# Patient Record
Sex: FEMALE | Race: WHITE | NOT HISPANIC OR LATINO | Employment: UNEMPLOYED | URBAN - METROPOLITAN AREA
[De-identification: names, ages, dates, MRNs, and addresses within clinical notes are randomized per-mention and may not be internally consistent; named-entity substitution may affect disease eponyms.]

---

## 2018-05-30 ENCOUNTER — APPOINTMENT (OUTPATIENT)
Dept: RADIOLOGY | Facility: CLINIC | Age: 58
End: 2018-05-30
Payer: COMMERCIAL

## 2018-05-30 ENCOUNTER — OFFICE VISIT (OUTPATIENT)
Dept: OBGYN CLINIC | Facility: CLINIC | Age: 58
End: 2018-05-30
Payer: COMMERCIAL

## 2018-05-30 VITALS — HEIGHT: 65 IN | BODY MASS INDEX: 21.99 KG/M2 | WEIGHT: 132 LBS

## 2018-05-30 DIAGNOSIS — M25.572 PAIN, JOINT, ANKLE AND FOOT, LEFT: ICD-10-CM

## 2018-05-30 DIAGNOSIS — S92.352A CLOSED FRACTURE OF BASE OF FIFTH METATARSAL BONE OF LEFT FOOT: Primary | ICD-10-CM

## 2018-05-30 DIAGNOSIS — S93.402A SPRAIN OF UNSPECIFIED LIGAMENT OF LEFT ANKLE, INITIAL ENCOUNTER: ICD-10-CM

## 2018-05-30 PROCEDURE — 73630 X-RAY EXAM OF FOOT: CPT

## 2018-05-30 PROCEDURE — 99203 OFFICE O/P NEW LOW 30 MIN: CPT | Performed by: ORTHOPAEDIC SURGERY

## 2018-05-30 NOTE — PROGRESS NOTES
Assessment/Plan:  1  Closed fracture of base of fifth metatarsal bone of left foot  XR ankle 3+ vw left    XR foot 3+ vw left   2  Sprain of unspecified ligament of left ankle, initial encounter         Scribe Attestation    I,:   Giancarlo Lazo am acting as a scribe while in the presence of the attending physician :        I,:   Radha Harden, DO personally performed the services described in this documentation    as scribed in my presence :            Lary Harper has left foot and ankle pain consistent with a mildly displaced fracture of the base of the 5th metatarsal  Her ankle pain seems to be consistent with a lateral ankle sprain  She was placed in a short cam walker boot  She can take the boot off to sleep and while she is resting  She can continue to ice and use compression as needed  She was advised to take an Asprin daily  She will follow up in 4 weeks for repeat evaluation and x-ray  Subjective:   Abner Daniels is a 60 y o  female who presents to the office today for left sided ankle and foot pain  About 2 1/2 weeks ago while traveling in [facility] she rolled her ankle  She had immediate pain and later in the day noticed swelling in her foot and ankle  She treated herself conservatively while in [facility] with compression stockings, ice, and topical pain cream   She said she wore supportive sneakers throughout the rest of the tripped and was only mildly uncomfortable and then worsened at night  At today's appointment she states her pain is a constant mild pain that increases with increased pressure on her foot and increased walking  She denies any new injury since the original injury about 2 weeks ago  She denies any radiating pain numbness or tingling at this time  Review of Systems   Constitutional: Negative for chills, fever and unexpected weight change  HENT: Negative for hearing loss, nosebleeds and sore throat  Eyes: Negative for pain, redness and visual disturbance     Respiratory: Negative for cough, shortness of breath and wheezing  Cardiovascular: Negative for chest pain, palpitations and leg swelling  Gastrointestinal: Negative for abdominal pain, nausea and vomiting  Endocrine: Negative for polydipsia and polyuria  Genitourinary: Negative for dysuria and hematuria  Musculoskeletal: Positive for arthralgias, joint swelling and myalgias  See HPI   Skin: Negative for rash and wound  Neurological: Negative for dizziness, numbness and headaches  Psychiatric/Behavioral: Negative for decreased concentration and suicidal ideas  The patient is not nervous/anxious  No past medical history on file  No past surgical history on file  No family history on file  Social History     Occupational History    Not on file  Social History Main Topics    Smoking status: Never Smoker    Smokeless tobacco: Never Used    Alcohol use No    Drug use: No    Sexual activity: Not on file       No current outpatient prescriptions on file  Allergies no known allergies    Objective: There were no vitals filed for this visit  Right Ankle Exam   Right ankle exam is normal       Left Ankle Exam   Swelling: mild    Tenderness   The patient is experiencing tenderness in the ATF (Base of the 5th metatarsal )  Range of Motion   The patient has normal left ankle ROM  Muscle Strength   The patient has normal left ankle strength  Other   Sensation: normal          Strength/Myotome Testing     Left Ankle/Foot   Normal strength      Physical Exam   Constitutional: She is oriented to person, place, and time  She appears well-developed and well-nourished  HENT:   Head: Normocephalic and atraumatic  Eyes: Conjunctivae are normal    Neck: Neck supple  Cardiovascular: Intact distal pulses  Pulmonary/Chest: Effort normal    Musculoskeletal:        Left foot: There is bony tenderness and swelling          Feet:    Neurological: She is alert and oriented to person, place, and time  Skin: Skin is warm and dry  Psychiatric: She has a normal mood and affect  Her behavior is normal    Vitals reviewed  I have personally reviewed pertinent films in PACS and my interpretation is as follows: Three-view left foot x-ray taken on May 30, 2018 shows a mild displaced fracture at the base of the 5th metatarsal   Three-view left ankle x-ray taken on May 30, 2018 shows no acute fracture dislocation

## 2018-09-14 ENCOUNTER — OFFICE VISIT (OUTPATIENT)
Dept: OBGYN CLINIC | Facility: CLINIC | Age: 58
End: 2018-09-14
Payer: COMMERCIAL

## 2018-09-14 ENCOUNTER — APPOINTMENT (OUTPATIENT)
Dept: RADIOLOGY | Facility: CLINIC | Age: 58
End: 2018-09-14
Payer: COMMERCIAL

## 2018-09-14 VITALS
WEIGHT: 135 LBS | DIASTOLIC BLOOD PRESSURE: 86 MMHG | HEIGHT: 65 IN | HEART RATE: 63 BPM | BODY MASS INDEX: 22.49 KG/M2 | SYSTOLIC BLOOD PRESSURE: 128 MMHG

## 2018-09-14 DIAGNOSIS — S92.352A CLOSED FRACTURE OF BASE OF FIFTH METATARSAL BONE OF LEFT FOOT: ICD-10-CM

## 2018-09-14 DIAGNOSIS — M79.672 PAIN IN LEFT FOOT: ICD-10-CM

## 2018-09-14 DIAGNOSIS — S92.352A CLOSED FRACTURE OF BASE OF FIFTH METATARSAL BONE OF LEFT FOOT: Primary | ICD-10-CM

## 2018-09-14 PROCEDURE — 73630 X-RAY EXAM OF FOOT: CPT

## 2018-09-14 PROCEDURE — 99213 OFFICE O/P EST LOW 20 MIN: CPT | Performed by: ORTHOPAEDIC SURGERY

## 2018-09-14 NOTE — PROGRESS NOTES
Assessment/Plan:  1  Closed fracture of base of fifth metatarsal bone of left foot  XR foot 3+ vw left   2  Pain in left foot  XR foot 3+ vw left       Gustavo Jones has left foot discomfort around the site of her healed fracture  There is some concern of discomfort over the distal 2nd metatarsal which could possibly related to a stress injury due to her altering her gait with continued pain in her 5th metatarsal   We discussed the necessity of obtaining an MRI to evaluate for stress injury  She would like to avoid doing this as she notes that is starting to feel little bit better  She will plan on using the Cam walker boot over the next 2-3 weeks see if this decreases discomfort allows for pain relief  If she has continued pain following the next 3 weeks we will then proceed with an MRI  Subjective:   Amna Stephenson is a 60 y o  female who presents for follow-up for left foot injury and 5th metatarsal fracture which occurred in May of this year  She was initially treated with a Cam walker boot advised to follow up in 4 weeks  She did not follow up and gradually wean herself out of the Cam boot as she was feeling better around 4 weeks  She states her foot felt fine and she started to notice increased discomfort over the base of 5th metatarsal as well as further throughout the dorsum of her left foot  She denies any new injury  She denies any radiating pain or numbness  Review of Systems   Constitutional: Negative for chills, fever and unexpected weight change  HENT: Negative for hearing loss, nosebleeds and sore throat  Eyes: Negative for pain, redness and visual disturbance  Respiratory: Negative for cough, shortness of breath and wheezing  Cardiovascular: Negative for chest pain, palpitations and leg swelling  Gastrointestinal: Negative for abdominal pain, nausea and vomiting  Endocrine: Negative for polydipsia and polyuria  Genitourinary: Negative for dysuria and hematuria  Musculoskeletal:        See HPI   Skin: Negative for rash and wound  Neurological: Negative for dizziness, numbness and headaches  Psychiatric/Behavioral: Negative for decreased concentration and suicidal ideas  The patient is not nervous/anxious  History reviewed  No pertinent past medical history  History reviewed  No pertinent surgical history  History reviewed  No pertinent family history  Social History     Occupational History    Not on file  Social History Main Topics    Smoking status: Never Smoker    Smokeless tobacco: Never Used    Alcohol use No    Drug use: No    Sexual activity: Not on file         Current Outpatient Prescriptions:     LEVOTHYROXINE SODIUM PO, Take by mouth, Disp: , Rfl:     No Known Allergies    Objective:  Vitals:    09/14/18 0840   BP: 128/86   Pulse: 63       Ortho Exam    Physical Exam   Constitutional: She is oriented to person, place, and time  She appears well-developed and well-nourished  HENT:   Head: Normocephalic and atraumatic  Eyes: Conjunctivae are normal    Neck: Neck supple  Cardiovascular: Intact distal pulses  Pulmonary/Chest: Effort normal    Musculoskeletal:        Left foot: There is tenderness and bony tenderness  Feet:    Neurological: She is alert and oriented to person, place, and time  Skin: Skin is warm and dry  Psychiatric: She has a normal mood and affect  Her behavior is normal    Vitals reviewed  I have personally reviewed pertinent films in PACS and my interpretation is as follows: Three-view x-ray of the left foot demonstrates a healed 5th metatarsal fracture without evidence of acute fracture   No abnormality over the distal 2nd metatarsal

## 2019-10-01 ENCOUNTER — APPOINTMENT (OUTPATIENT)
Dept: RADIOLOGY | Facility: CLINIC | Age: 59
End: 2019-10-01
Payer: COMMERCIAL

## 2019-10-01 ENCOUNTER — OFFICE VISIT (OUTPATIENT)
Dept: OBGYN CLINIC | Facility: CLINIC | Age: 59
End: 2019-10-01
Payer: COMMERCIAL

## 2019-10-01 VITALS
BODY MASS INDEX: 23.36 KG/M2 | DIASTOLIC BLOOD PRESSURE: 73 MMHG | WEIGHT: 140.2 LBS | HEART RATE: 56 BPM | SYSTOLIC BLOOD PRESSURE: 119 MMHG | HEIGHT: 65 IN

## 2019-10-01 DIAGNOSIS — M25.461 SWELLING OF RIGHT KNEE JOINT: ICD-10-CM

## 2019-10-01 DIAGNOSIS — M25.561 RIGHT KNEE PAIN, UNSPECIFIED CHRONICITY: ICD-10-CM

## 2019-10-01 DIAGNOSIS — M17.11 PRIMARY OSTEOARTHRITIS OF RIGHT KNEE: Primary | ICD-10-CM

## 2019-10-01 PROCEDURE — 73562 X-RAY EXAM OF KNEE 3: CPT

## 2019-10-01 PROCEDURE — 99214 OFFICE O/P EST MOD 30 MIN: CPT | Performed by: ORTHOPAEDIC SURGERY

## 2019-10-01 NOTE — PROGRESS NOTES
Assessment/Plan:  1  Primary osteoarthritis of right knee  MRI knee right  wo contrast   2  Right knee pain, unspecified chronicity  XR knee 3 vw right non injury   3  Swelling of right knee joint  MRI knee right  wo contrast       Scribe Attestation    I,:   Jim Washington am acting as a scribe while in the presence of the attending physician :        I,:   Cm De León MD personally performed the services described in this documentation    as scribed in my presence :              Leonidas Pulido upon examination and review the x-rays of her right knee does demonstrate tricompartmental osteoarthritis most severe medial and patellofemoral compartments  I do have concern that there is a possible tear to the medial meniscus as she does have moderate swelling to the anterior medial aspect of the knee and is point tender along the joint line  I did note to her that I do have concern as she does experience pain with a plant and pivot movement on her knee and noted that this is consistent with a meniscus pathology  She has also been treated with home exercises in the past and does not recall any improvement in her symptoms  Given the findings on her clinical exam, and x-rays, and history of mechanical symptoms into her right knee while weight-bearing  I would like to order an MRI of the right knee to question medial meniscus tear  I did provide her with a prescription for this today, and my office will help facilitate this appointment  I would like Munirashayne Akin to follow up with me when she has the MRI complete  Subjective:   Sylvie Benjamin is a 60 y o  female who presents to the office today for initial evaluation for the her right knee  She states she has been experiencing intermittent and mild to moderate sharp pain about the anterior medial aspect of her knee over the past several months  However has had activity related pain for many years    She states that sitting for long period time will cause stiffness when she gets up to ambulate particularly in the morning when she tries the you descend stairs  She does experience intermittent crepitus into the knee as well as the painful click if she pivots on her knee  She denies any bouts of instability or any giving way sensations into her knee  She does appreciate swelling to the anterior medial aspect that can be painful to touch at times  She does maintain full range of motion however notes that deep kneeling as to get something from a bottom cabinet will result in pain and difficulty trying to stand back up  Today she denies any distal paresthesias  Review of Systems   Constitutional: Negative for chills, fever and unexpected weight change  HENT: Negative for hearing loss, nosebleeds and sore throat  Eyes: Negative for pain, redness and visual disturbance  Respiratory: Negative for cough, shortness of breath and wheezing  Cardiovascular: Negative for chest pain, palpitations and leg swelling  Gastrointestinal: Negative for abdominal pain, nausea and vomiting  Endocrine: Negative for polydipsia and polyuria  Genitourinary: Negative for dysuria and hematuria  Musculoskeletal: Positive for arthralgias, joint swelling and myalgias  See HPI   Skin: Negative for rash and wound  Neurological: Negative for dizziness, numbness and headaches  Psychiatric/Behavioral: Negative for decreased concentration and suicidal ideas  The patient is not nervous/anxious            Past Medical History:   Diagnosis Date    Disease of thyroid gland     Osteoporosis        Past Surgical History:   Procedure Laterality Date    ELBOW SURGERY  1974    fracture (pin placement and removal)       Family History   Problem Relation Age of Onset    Cancer Mother         bladder    Parkinsonism Father     No Known Problems Sister     No Known Problems Brother     No Known Problems Maternal Aunt     No Known Problems Maternal Uncle     No Known Problems Paternal Aunt     No Known Problems Paternal Uncle     No Known Problems Maternal Grandmother     No Known Problems Maternal Grandfather     No Known Problems Paternal Grandmother     No Known Problems Paternal Grandfather        Social History     Occupational History    Not on file   Tobacco Use    Smoking status: Never Smoker    Smokeless tobacco: Never Used   Substance and Sexual Activity    Alcohol use: No    Drug use: No    Sexual activity: Not on file         Current Outpatient Medications:     IBANDRONATE SODIUM PO, Take 150 mg by mouth, Disp: , Rfl:     LEVOTHYROXINE SODIUM PO, Take by mouth, Disp: , Rfl:     No Known Allergies    Objective:  Vitals:    10/01/19 1042   BP: 119/73   Pulse: 56       Right Knee Exam     Tenderness   The patient is experiencing tenderness in the medial joint line  Range of Motion   Extension: 0   Flexion: 150     Tests   Emeka:  Medial - positive Lateral - negative  Varus: negative Valgus: negative  Drawer:  Anterior - negative    Posterior - negative    Other   Erythema: absent  Sensation: normal  Pulse: present  Swelling: moderate (anteromedial aspect of the knee)            Physical Exam   Constitutional: She is oriented to person, place, and time  She appears well-developed and well-nourished  HENT:   Head: Normocephalic and atraumatic  Eyes: Conjunctivae are normal  Right eye exhibits no discharge  Left eye exhibits no discharge  Neck: Normal range of motion  Neck supple  Cardiovascular: Normal rate and intact distal pulses  Pulmonary/Chest: Effort normal  No respiratory distress  Musculoskeletal:   As noted in HPI   Neurological: She is alert and oriented to person, place, and time  Skin: Skin is warm and dry  Psychiatric: She has a normal mood and affect  Her behavior is normal  Judgment and thought content normal    Vitals reviewed        I have personally reviewed pertinent films in PACS and my interpretation is as follows:    X-rays of the right knee demonstrates mild to moderate tricompartmental osteoarthritis with joint line narrowing and osteophyte formation at the medial, and patellofemoral compartment

## 2019-10-16 ENCOUNTER — HOSPITAL ENCOUNTER (OUTPATIENT)
Dept: MRI IMAGING | Facility: HOSPITAL | Age: 59
Discharge: HOME | End: 2019-10-16
Payer: COMMERCIAL

## 2019-10-16 DIAGNOSIS — M25.461 SWELLING OF RIGHT KNEE JOINT: ICD-10-CM

## 2019-10-16 DIAGNOSIS — M25.561 RIGHT KNEE PAIN, UNSPECIFIED CHRONICITY: ICD-10-CM

## 2019-10-16 PROCEDURE — 73721 MRI JNT OF LWR EXTRE W/O DYE: CPT

## 2019-10-17 ENCOUNTER — TELEPHONE (OUTPATIENT)
Dept: OBGYN CLINIC | Facility: CLINIC | Age: 59
End: 2019-10-17

## 2019-10-17 ENCOUNTER — TELEPHONE (OUTPATIENT)
Dept: OBGYN CLINIC | Facility: HOSPITAL | Age: 59
End: 2019-10-17

## 2019-10-17 NOTE — TELEPHONE ENCOUNTER
lmom to call office if she would like results before fu 10/18  ----- Message from Lashawn Spears PA-C sent at 10/17/2019  9:26 AM EDT -----  Arthritis  Mild fraying of meniscus but no tears  Injections and or PT advised    ----- Message -----  From: Interface, Radiology Results In  Sent: 10/17/2019   8:40 AM EDT  To: Lashawn Spears PA-C

## 2019-10-17 NOTE — TELEPHONE ENCOUNTER
I think this was entered for the wrong paitent  There are no office notes or MRI for this patient and I did not call her

## 2019-10-17 NOTE — TELEPHONE ENCOUNTER
Garry [place]endale  [phone]    Dr Loyda Robison    Patient returned call for Josee Mcwilliams about MRI  Please return her call

## 2019-10-18 ENCOUNTER — OFFICE VISIT (OUTPATIENT)
Dept: OBGYN CLINIC | Facility: CLINIC | Age: 59
End: 2019-10-18
Payer: COMMERCIAL

## 2019-10-18 VITALS
HEIGHT: 65 IN | SYSTOLIC BLOOD PRESSURE: 146 MMHG | BODY MASS INDEX: 23.39 KG/M2 | WEIGHT: 140.4 LBS | DIASTOLIC BLOOD PRESSURE: 84 MMHG | HEART RATE: 59 BPM

## 2019-10-18 DIAGNOSIS — M17.11 PRIMARY OSTEOARTHRITIS OF RIGHT KNEE: Primary | ICD-10-CM

## 2019-10-18 PROCEDURE — 99214 OFFICE O/P EST MOD 30 MIN: CPT | Performed by: ORTHOPAEDIC SURGERY

## 2019-10-18 NOTE — PATIENT INSTRUCTIONS
Knee Exercises   AMBULATORY CARE:   What you need to know about knee exercises:  Knee exercises help strengthen the muscles around your knee  Strong muscles can help reduce pain and decrease your risk of future injury  Knee exercises also help you heal after an injury or surgery  · Start slow  These are beginning exercises  Ask your healthcare provider if you need to see a physical therapist for more advanced exercises  As you get stronger, you may be able to do more sets of each exercise or add weights  · Stop if you feel pain  It is normal to feel some discomfort at first  Regular exercise will help decrease your discomfort over time  · Do the exercises on both legs  Do this so both knees remain strong  · Warm up before you do knee exercises  Walk or ride a stationary bike for 5 or 10 minutes to warm your muscles  How to perform knee stretches safely:  Always stretch before you do strengthening exercises  Do these stretching exercises again after you do the strengthening exercises  Do these stretches 4 or 5 days a week, or as directed  · Standing calf stretch: Face a wall and place both palms flat on the wall, or hold the back of a chair for balance  Keep a slight bend in your knees  Take a big step backward with one leg  Keep your other leg directly under you  Keep both heels flat and press your hips forward  Hold the stretch for 30 seconds, and then relax for 30 seconds  Switch legs  Repeat 2 or 3 times on each leg  · Standing quadriceps stretch:  Stand and place one hand against a wall or hold the back of a chair for balance  With your weight on one leg, bend your other leg and grab your ankle  Bring your heel toward your buttocks  Hold the stretch for 30 to 60 seconds  Switch legs  Repeat 2 or 3 times on each leg  · Sitting hamstring stretch:  Sit with both legs straight in front of you  Do not point or flex your toes   Place your palms on the floor and slide your hands forward until you feel the stretch  Do not round your back  Hold the stretch for 30 seconds  Repeat 2 or 3 times  How to perform knee strengthening exercises safely:  Do these exercises 4 or 5 days a week, or as directed  · Standing half squats:  Stand with your feet shoulder-width apart  Lean your back against a wall or hold the back of a chair for balance, if needed  Slowly sit down about 10 inches, as if you are going to sit in a chair  Your body weight should be mostly over your heels  Hold the squat for 5 seconds, then rise to a standing position  Do 3 sets of 10 squats to strengthen your buttocks and thighs  · Standing hamstring curls: Face a wall and place both palms flat on the wall, or hold the back of a chair for balance  With your weight on one leg, lift your other foot as close to your buttocks as you can  Hold for 5 seconds and then lower your leg  Do 2 sets of 10 curls on each leg  This exercise strengthens the muscles in the back of your thigh  · Standing calf raises:  Face a wall and place both palms flat on the wall, or hold the back of a chair for balance  Stand up straight, and do not lean  Place all your weight on one leg by lifting the other foot off the floor  Raise the heel of the foot that is on the floor as high as you can and then lower it  Do 2 sets of 10 calf raises on each leg to strengthen your calf muscles  · Straight leg lifts:  Lie on your stomach with straight legs  Fold your arms in front of you and rest your head in your arms  Tighten your leg muscles and raise one leg as high as you can  Hold for 5 seconds, then lower your leg  Do 2 sets of 10 lifts on each leg to strengthen your buttocks  · Sitting leg lifts:  Sit in a chair  Slowly straighten and raise one leg  Squeeze your thigh muscles and hold for 5 seconds  Relax and return your foot to the floor  Do 2 sets of 10 lifts on each leg   This helps strengthen the muscles in the front of your thigh  Contact your healthcare provider if:   · You have new pain or your pain becomes worse  · You have questions or concerns about your condition or care  © 2017 [employer] Information is for End User's use only and may not be sold, redistributed or otherwise used for commercial purposes  All illustrations and images included in CareNotes® are the copyrighted property of [employer]  or [employer]  The above information is an  only  It is not intended as medical advice for individual conditions or treatments  Talk to your doctor, nurse or pharmacist before following any medical regimen to see if it is safe and effective for you

## 2019-10-18 NOTE — PROGRESS NOTES
Assessment/Plan:  1  Primary osteoarthritis of right knee         Scribe Attestation    I,:   Huey Bell am acting as a scribe while in the presence of the attending physician :        I,:   Jung Mccormack MD personally performed the services described in this documentation    as scribed in my presence :              Gali's MRI demonstrates tricompartmental osteoarthritis most severe in the medial compartent  There is a small medial meniscus tear  In my opinion the osteoarthritis is the main cause of Gali's knee pain  The knee is not presenting with mechanical symptoms other than the pain with pivoting motions  I do not feel she would benefit from a meniscectomy or would it be successful in relieving her pain  I would like to treat this conservatively  We discussed many treatments options moving forward such as utilizing heat, ice, tylenol, ibuprofen, cortisone steroid injection, joint lubricating injections, home exercise and formal physical therapy  For now Meet Hemphill is agreeable to formal physical therapy and home exercises  She will return to see us if the therapy and exercises not improving her symptoms  If this occurs we will further discuss joint lubricating injections  I do not feel she is a candidate for total joint replacement at this point but it may be needed in the future  I expressed the importance of low-impact exercise to promote knee health  She will follow up with us as needed  Subjective:   Tadeo Perdomo is a 60 y o  female who presents to the office today for re-evaluation of her right knee  On last visit a medial meniscus tear in the presence of arthritis was suspected and an MRI was ordered  She has had the MRI we will discuss those results today  Nicki Jones continues to have pain about the medial aspect of the right knee  This will occur with prolonged walking or standing and pivoting motions    She states the knee is actually felt very well over the past 3 days is not experiencing much pain  She will wear a compression knee sleeve which does provide her relief  She denies distal paresthesias  Review of Systems   Constitutional: Positive for activity change  Negative for chills, fever and unexpected weight change  HENT: Negative for hearing loss, nosebleeds and sore throat  Eyes: Negative for pain, redness and visual disturbance  Respiratory: Negative for cough, shortness of breath and wheezing  Cardiovascular: Negative for chest pain, palpitations and leg swelling  Gastrointestinal: Negative for abdominal pain, nausea and vomiting  Endocrine: Negative for polydipsia and polyuria  Genitourinary: Negative for dysuria and hematuria  Musculoskeletal:        See HPI   Skin: Negative for rash and wound  Neurological: Negative for dizziness, numbness and headaches  Psychiatric/Behavioral: Negative for decreased concentration and suicidal ideas  The patient is not nervous/anxious            Past Medical History:   Diagnosis Date    Disease of thyroid gland     Osteoporosis        Past Surgical History:   Procedure Laterality Date    ELBOW SURGERY  1974    fracture (pin placement and removal)       Family History   Problem Relation Age of Onset    Cancer Mother         bladder    Parkinsonism Father     No Known Problems Sister     No Known Problems Brother     No Known Problems Maternal Aunt     No Known Problems Maternal Uncle     No Known Problems Paternal Aunt     No Known Problems Paternal Uncle     No Known Problems Maternal Grandmother     No Known Problems Maternal Grandfather     No Known Problems Paternal Grandmother     No Known Problems Paternal Grandfather        Social History     Occupational History    Not on file   Tobacco Use    Smoking status: Never Smoker    Smokeless tobacco: Never Used   Substance and Sexual Activity    Alcohol use: No    Drug use: No    Sexual activity: Not on file         Current Outpatient Medications:     IBANDRONATE SODIUM PO, Take 150 mg by mouth, Disp: , Rfl:     LEVOTHYROXINE SODIUM PO, Take by mouth, Disp: , Rfl:     No Known Allergies    Objective:  Vitals:    10/18/19 0828   BP: 146/84   Pulse: 59       Right Knee Exam     Tenderness   The patient is experiencing tenderness in the medial joint line  Range of Motion   Extension: 0   Flexion: 140     Tests   Emeka:  Medial - negative Lateral - negative  Varus: negative Valgus: negative  Lachman:  Anterior - negative    Posterior - negative  Drawer:  Anterior - negative    Posterior - negative    Other   Erythema: absent  Scars: absent  Sensation: normal  Pulse: present  Swelling: mild  Effusion: no effusion present      Right Hand Exam     Comments:  Heberden's nodes      Left Hand Exam     Comments:  Heberden's nodes          Observations     Right Knee   Negative for effusion  Physical Exam   Constitutional: She is oriented to person, place, and time  She appears well-developed and well-nourished  HENT:   Head: Normocephalic and atraumatic  Eyes: Conjunctivae are normal  Right eye exhibits no discharge  Left eye exhibits no discharge  Neck: Normal range of motion  Neck supple  Cardiovascular: Regular rhythm and intact distal pulses  Pulmonary/Chest: Effort normal  No stridor  No respiratory distress  Musculoskeletal:        Right knee: She exhibits no effusion  Neurological: She is alert and oriented to person, place, and time  Skin: Skin is warm and dry  Psychiatric: She has a normal mood and affect  Her behavior is normal    Vitals reviewed  I have personally reviewed pertinent films in PACS and my interpretation is as follows:  MRI of the right knee demonstrates tricompartmental osteoarthritis most severe in the medial compartment  There is a small medial meniscus tear

## 2021-04-06 ENCOUNTER — TELEPHONE (OUTPATIENT)
Dept: OBGYN CLINIC | Facility: CLINIC | Age: 61
End: 2021-04-06

## 2021-04-06 NOTE — TELEPHONE ENCOUNTER
Dr Owen   #: [phone]  Email: [email or web address]         Patient is calling in requesting medical records to be sent to her  I advised she has to sign a release form  She stated she moved out of the [place] and is wondering if we can email her the release form and she will fax it back           Please advise,

## 2024-05-24 ENCOUNTER — DASHBOARD ENCOUNTERS (OUTPATIENT)
Age: 64
End: 2024-05-24

## 2024-05-24 ENCOUNTER — OFFICE VISIT (OUTPATIENT)
Dept: URBAN - METROPOLITAN AREA CLINIC 72 | Facility: CLINIC | Age: 64
End: 2024-05-24
Payer: MEDICARE

## 2024-05-24 VITALS
WEIGHT: 136.8 LBS | BODY MASS INDEX: 22.79 KG/M2 | DIASTOLIC BLOOD PRESSURE: 83 MMHG | SYSTOLIC BLOOD PRESSURE: 116 MMHG | TEMPERATURE: 97.5 F | HEIGHT: 65 IN | HEART RATE: 60 BPM

## 2024-05-24 DIAGNOSIS — Z86.010 HISTORY OF ADENOMATOUS POLYP OF COLON: ICD-10-CM

## 2024-05-24 DIAGNOSIS — K64.9 HEMORRHOIDS, UNSPECIFIED HEMORRHOID TYPE: ICD-10-CM

## 2024-05-24 DIAGNOSIS — K59.09 CONSTIPATION, CHRONIC: ICD-10-CM

## 2024-05-24 PROBLEM — 429047008: Status: ACTIVE | Noted: 2024-05-24

## 2024-05-24 PROBLEM — 236069009: Status: ACTIVE | Noted: 2024-05-24

## 2024-05-24 PROCEDURE — 99204 OFFICE O/P NEW MOD 45 MIN: CPT | Performed by: INTERNAL MEDICINE

## 2024-05-24 RX ORDER — UBIDECARENONE 200 MG
AS DIRECTED CAPSULE ORAL
Status: ACTIVE | COMMUNITY

## 2024-05-24 RX ORDER — LEVOTHYROXINE SODIUM 100 UG/1
1 TABLET IN THE MORNING ON AN EMPTY STOMACH TABLET ORAL ONCE A DAY
Status: ACTIVE | COMMUNITY

## 2024-05-24 RX ORDER — ROSUVASTATIN CALCIUM 5 MG/1
TAKE 1 TABLET BY MOUTH EVERYDAY AT BEDTIME TABLET, FILM COATED ORAL
Qty: 90 EACH | Refills: 0 | Status: ACTIVE | COMMUNITY